# Patient Record
Sex: FEMALE | Race: WHITE | Employment: STUDENT | URBAN - METROPOLITAN AREA
[De-identification: names, ages, dates, MRNs, and addresses within clinical notes are randomized per-mention and may not be internally consistent; named-entity substitution may affect disease eponyms.]

---
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
## 2023-02-05 ENCOUNTER — HOSPITAL ENCOUNTER (EMERGENCY)
Age: 18
Discharge: HOME | End: 2023-02-05
Attending: EMERGENCY MEDICINE
Payer: COMMERCIAL

## 2023-02-05 ENCOUNTER — APPOINTMENT (OUTPATIENT)
Dept: GENERAL RADIOLOGY | Age: 18
End: 2023-02-05
Payer: COMMERCIAL

## 2023-02-05 VITALS
RESPIRATION RATE: 20 BRPM | OXYGEN SATURATION: 99 % | WEIGHT: 148 LBS | TEMPERATURE: 98.1 F | HEIGHT: 67 IN | BODY MASS INDEX: 23.23 KG/M2 | HEART RATE: 73 BPM | DIASTOLIC BLOOD PRESSURE: 56 MMHG | SYSTOLIC BLOOD PRESSURE: 115 MMHG

## 2023-02-05 DIAGNOSIS — S89.91XA INJURY OF RIGHT LOWER EXTREMITY, INITIAL ENCOUNTER: Primary | ICD-10-CM
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
## 2023-02-05 PROCEDURE — 73590 X-RAY EXAM OF LOWER LEG: CPT

## 2023-02-05 PROCEDURE — 73610 X-RAY EXAM OF ANKLE: CPT

## 2023-02-05 PROCEDURE — 99283 EMERGENCY DEPT VISIT LOW MDM: CPT

## 2023-02-06 ASSESSMENT — ENCOUNTER SYMPTOMS
ABDOMINAL PAIN: 0
DIARRHEA: 0
RHINORRHEA: 0
BACK PAIN: 0
SHORTNESS OF BREATH: 0
CHEST TIGHTNESS: 0
COUGH: 0
VOMITING: 0

## 2023-02-06 NOTE — ED PROVIDER NOTES
Emergency Department Attending Physician Note  Location: [address] [place] [address]  2/5/2023       Pt Name: Pat Marquez  MRN: [id number]  [place] 2005    Date of evaluation: 2/5/2023  Provider: Silvestre Kumar DO  PCP: Lorrie Collins (Inactive)    Note Started: 8:14 AM EST 2/6/23    CHIEF COMPLAINT:  Chief Complaint   Patient presents with    Leg Injury     Leg injury 01/31/2023, skiing accident. HISTORY OF PRESENT ILLNESS:  History obtained by patient. Limitations to history : None. Pat Marquez is a 21 y.o. female with no significant past medical history, presenting emergency department today with concerns of a right lower extremity injury after she was skiing last week. Says on the 31st, about 6 days ago, was skiing, and demonstrates a mechanism of catching her toe on her ski, demonstrating hyper plantarflexion of the foot with a inversion injury. She says there is pain spiraling around her tibia. Says she can walk, but it hurts to do so. Denies any decreased range of motion of the ankle or knee. Denies any pain to the lateral aspect of the knee. Did not fall to the ground, but says \"it was a normal fall,\" and denies any significant head injury, neck injury, chest or abdomen pain. Did not lose consciousness. Was able to continue with her daily activities. Is present now with parents, to rule out fracture. Denies any numbness, weakness, tingling. No wounds or swelling. No other medical concerns or concerns for pregnancy here in the emergency department. Nursing Notes were all reviewed and agreed with or any disagreements were addressed in the HPI. MEDICAL HISTORY  History reviewed. No pertinent past medical history. SURGICAL HISTORY  History reviewed. No pertinent surgical history. CURRENT MEDICATIONS  There are no discharge medications for this patient. ALLERGIES  Patient has no known allergies. FAMILYHISTORY  History reviewed.  No pertinent family history. SOCIAL HISTORY  Social History     Tobacco Use    Smoking status: Never   Substance Use Topics    Alcohol use: No    Drug use: No       SCREENINGS            CIWA Assessment  BP: (!) 115/56  Heart Rate: 73           REVIEW OF SYSTEMS:    Review of Systems   Constitutional:  Negative for activity change, appetite change, fatigue and fever. HENT:  Negative for congestion and rhinorrhea. Respiratory:  Negative for cough, chest tightness and shortness of breath. Cardiovascular:  Negative for chest pain and leg swelling. Gastrointestinal:  Negative for abdominal pain, diarrhea and vomiting. Genitourinary:  Negative for dysuria, flank pain and pelvic pain. Musculoskeletal:  Positive for arthralgias (leg pain, RLL). Negative for back pain, gait problem and neck pain. Skin:  Negative for rash and wound. Neurological:  Negative for syncope, light-headedness and headaches. Positives and Pertinent negatives as per HPI. PHYSICAL EXAM:     Vitals:    02/05/23 1453   BP: (!) 115/56   Pulse: 73   Resp: 20   Temp: 98.1 °F (36.7 °C)   TempSrc: Oral   SpO2: 99%   Weight: 148 lb (67.1 kg)   Height: 5' 6.5\" (1.689 m)       Physical Exam  Vitals reviewed. Constitutional:       General: She is not in acute distress. Appearance: Normal appearance. She is not ill-appearing. HENT:      Head: Normocephalic and atraumatic. Right Ear: External ear normal.      Left Ear: External ear normal.      Nose: Nose normal. No congestion. Mouth/Throat:      Mouth: Mucous membranes are moist.      Pharynx: Oropharynx is clear. Eyes:      General:         Right eye: No discharge. Left eye: No discharge. Conjunctiva/sclera: Conjunctivae normal.   Pulmonary:      Effort: Pulmonary effort is normal. No respiratory distress. Abdominal:      General: Abdomen is flat. There is no distension. Palpations: Abdomen is soft. Tenderness:  There is no abdominal tenderness. Musculoskeletal:         General: Tenderness present. No swelling. Cervical back: Normal range of motion and neck supple. Right lower leg: No edema. Left lower leg: No edema. Comments: To palpation along the anterior tibial shaft, and somewhat along the lateral aspect of the fibula. No point tenderness to palpation. No fibular head tenderness to palpation. Full range of motion of the knee and ankle. No bruising, swelling, step-offs, deformities   Skin:     General: Skin is warm and dry. Neurological:      General: No focal deficit present. Mental Status: She is alert and oriented to person, place, and time. Psychiatric:         Mood and Affect: Mood normal.         Behavior: Behavior normal.     DIAGNOSTIC RESULTS:    LABS:    Labs Reviewed - No data to display    When ordered, only abnormal lab results are displayed. All other labs were within normal range or not returned as of this dictation. RADIOLOGY:    Non-plain film images such as CT, Ultrasound and MRI are read by the radiologist. Interpretation per the Radiologist below, if available at the time of this note:  XR ANKLE RIGHT (MIN 3 VIEWS)   Final Result   1. No acute osseous abnormality the right ankle or the right tibia and fibula. XR TIBIA FIBULA RIGHT (2 VIEWS)   Final Result   1. No acute osseous abnormality the right ankle or the right tibia and fibula. PROCEDURES:  Unless otherwise noted below, none. Procedures    MEDICATIONS:   Medications - No data to display  _____________________________________    MEDICAL DECISION MAKING:     Patient is a 21 y.o. female without significant past medical history, presenting emergency department today to rule out fracture, on right lower extremity, after skiing incident. I am concerned there could be a stress fracture given mechanism as above, however patient has no signs of trauma, is ambulating, and has full range of motion of the joint. Neurovascular intact. Otherwise vital signs stable. Does not wish for any medication today in the emergency department. Right ankle without obvious osseous abnormality, right tibia-fibula without fracture as well. Discussed that there could be a fracture still there like a stress fracture, however she is already a week out and I would expect some sort of calcification at this point. Follow-up with PCP for consideration of repeat imaging. I would not like to immobilize her, discussed range of motion, and rest, ice, elevation, and continuing on Naprosyn. Discharged home in stable condition. Follow-up with PCP. Strict return precaution provided at length      CLINICAL IMPRESSION:     ICD-10-CM    1. Injury of right lower extremity, initial encounter  S89. 91XA       2. Fall from skis, initial encounter  V00.321A           DISPOSITION:  Discharge to home    I discussed the results, including any incidental findings, with patient and through shared decision making,  disposition today from the ED will be: Discharge to home in stable condition. Questions answered. They are agreeable to plan and express understanding of plan. DISCHARGE MEDICATIONS (if applicable): There are no discharge medications for this patient. DISCONTINUED MEDICATIONS:  There are no discharge medications for this patient. DISPOSITION REFERRAL (if applicable):  [facility] [place]  [phone]    If symptoms worsen, As needed    Kemper Heimlich    Schedule an appointment as soon as possible for a visit       _____________________________________       Elsa Barclay DO, ([facility]) am the primary attending of record and contributed the majority of evaluation and treatment of emergent care for this encounter.      This chart was generated in part by using Dragon Dictation system and may contain errors related to that system including errors in grammar, punctuation, and spelling, as well as words and phrases that may be inappropriate. If there are any questions or concerns please feel free to contact the dictating provider for clarification.      SALAS RODRIGUEZ DO (electronically signed)   [address]  02/06/23 3965 Patient